# Patient Record
Sex: FEMALE | Race: WHITE | URBAN - METROPOLITAN AREA
[De-identification: names, ages, dates, MRNs, and addresses within clinical notes are randomized per-mention and may not be internally consistent; named-entity substitution may affect disease eponyms.]

---

## 2017-12-11 ENCOUNTER — OFFICE VISIT (OUTPATIENT)
Dept: DERMATOLOGY | Age: 47
End: 2017-12-11

## 2017-12-11 DIAGNOSIS — L71.9 ROSACEA: Primary | ICD-10-CM

## 2017-12-11 PROCEDURE — 99202 OFFICE O/P NEW SF 15 MIN: CPT | Performed by: DERMATOLOGY

## 2017-12-11 RX ORDER — IVERMECTIN 10 MG/G
CREAM TOPICAL
COMMUNITY
End: 2019-09-18

## 2017-12-11 NOTE — PROGRESS NOTES
[facility]) Dermatology  Edmar Salmon M.D.  [phone]       Ranjan Herring  1970    41 y.o. female     Date of Visit: 12/11/2017    Chief Complaint:   Chief Complaint   Patient presents with   [address] [facility] patient here today for rosacea        I was asked to see this patient by Dr. Burris ref. provider found. History of Present Illness:  1. Patient moved here from [place] to establish care. Has had vascular rosacea for years. Was initially treated with MetroCream, which was slightly irritating. Then more recently changed to topical ivermectin 1% cream.  Has uses inconsistently for about 5 months with minimal improvement. Has also had laser done twice-IPL and more recently spot treatment of a telangiectasia on her chin. Noted that laser did result in improvement but her erythema had recurred within a few months. Does not develop inflammatory papules. Has tried Trenda Tyra previously, but does not like the idea of something that is not truly leading to resolution. Rhytides forehead and left lower cutaneous lip-has been using samples of Retin-A Micro 0.08%. Would like new samples. Does not want fillers or Botox      Review of Systems:  Constitutional: Reports general sense of well-being       Past Medical History, Surgical History, Family History, Medications and Allergies reviewed. Social History:   Social History     Social History    Marital status:      Spouse name: N/A    Number of children: N/A    Years of education: N/A     Occupational History    Not on file. Social History Main Topics    Smoking status: Never Smoker    Smokeless tobacco: Never Used    Alcohol use Yes    Drug use: Unknown    Sexual activity: Not on file     Other Topics Concern    Not on file     Social History Narrative    No narrative on file       Physical Examination       -General: Well-appearing, NAD  Well-developed well-nourished.   Background erythema forehead, nose, paranasal cheeks, chin. No active inflammatory papules. Assessment and Plan     1. Rosacea - Discussed vascular rosacea. Discussed consultation for laser- [facility]. Sample of Mirvaso and Rhofade given to patient. She wants to continue topical ivermectin and try this twice a day consistently. Gentle soaps, moisturizer with sunscreen,    2. Rhytides- Discussed Botox and fillers-prefers sample of Retin-A Micro 0.08, which was given to the patient.   Reviewed proper use and side effects

## 2017-12-15 ENCOUNTER — TELEPHONE (OUTPATIENT)
Dept: DERMATOLOGY | Age: 47
End: 2017-12-15

## 2018-08-30 ENCOUNTER — TELEPHONE (OUTPATIENT)
Dept: DERMATOLOGY | Age: 48
End: 2018-08-30

## 2018-08-30 NOTE — TELEPHONE ENCOUNTER
Zaina is scheduled in February for laser on her face, rosacea. She just had a few questions about the procedure. She is referred by Dr. Niurka You.  [phone]

## 2018-09-04 NOTE — TELEPHONE ENCOUNTER
Spoke to patient regarding laser questions:    Patient would be more comfortable just starting with a small treament area on her face due to patient's comfort level. Patient scheduled in February for NP-laser for rosacea.

## 2018-11-05 ENCOUNTER — OFFICE VISIT (OUTPATIENT)
Dept: DERMATOLOGY | Age: 48
End: 2018-11-05
Payer: COMMERCIAL

## 2018-11-05 DIAGNOSIS — D22.9 BENIGN NEVUS: ICD-10-CM

## 2018-11-05 DIAGNOSIS — L71.9 ROSACEA: Primary | ICD-10-CM

## 2018-11-05 DIAGNOSIS — L57.8 DIFFUSE PHOTODAMAGE OF SKIN: ICD-10-CM

## 2018-11-05 PROCEDURE — 99213 OFFICE O/P EST LOW 20 MIN: CPT | Performed by: DERMATOLOGY

## 2018-11-05 NOTE — PROGRESS NOTES
[facility]) Dermatology  Charis Antunez M.D.  [phone]       Devora Simental  1970    57 y.o. female     Date of Visit: 11/5/2018    Chief Complaint:   Chief Complaint   Patient presents with    Rosacea     face   yearly check    Other     couple new spots on face        I was asked to see this patient by Dr. Burris ref. provider found. History of Present Illness:  1. Patient presents today for follow-up of rosacea-progressive erythema of her face. Would like to pursue laser. Has previously tried Mirvaso-does not like temporary improvement. Looking for a more definitive fix. Has had IPL previously and did have good improvement, slowly recurrence over years. Has noticed more flushing with recent hot flashes. Previous treatments include IPL, MetroCream, topical ivermectin, Mirvaso    Uses samples of Retin-A Micro 0.08% for her forehead and lower cutaneous lip. Happy with improvement. Does not have difficulty with erythema or scale- not interested in Botox her fill her      Review of Systems:  Constitutional: Reports general sense of well-being       Past Medical History, Surgical History, Family History, Medications and Allergies reviewed. Social History:   Social History     Social History    Marital status:      Spouse name: N/A    Number of children: N/A    Years of education: N/A     Occupational History    Not on file. Social History Main Topics    Smoking status: Never Smoker    Smokeless tobacco: Never Used    Alcohol use Yes    Drug use: Unknown    Sexual activity: Not on file     Other Topics Concern    Not on file     Social History Narrative    No narrative on file       Physical Examination       -General: Well-appearing, NAD  Mild background erythema of her face. No discrete inflammatory papules. Scattered benign nevi. Scattered freckles and lentigines      Assessment and Plan     1. Rosacea -Pursue laser.   Discussed Mirvaso or Rhofade for episodic

## 2019-02-07 ENCOUNTER — PROCEDURE VISIT (OUTPATIENT)
Dept: DERMATOLOGY | Age: 49
End: 2019-02-07

## 2019-02-07 DIAGNOSIS — Z41.1 ELECTIVE PROCEDURE FOR UNACCEPTABLE COSMETIC APPEARANCE: ICD-10-CM

## 2019-02-07 DIAGNOSIS — L71.9 ROSACEA: Primary | ICD-10-CM

## 2019-02-07 PROCEDURE — DM01315 VBEAM LASER MEDIUM OR 3 AREAS: Performed by: DERMATOLOGY

## 2019-03-12 ENCOUNTER — OFFICE VISIT (OUTPATIENT)
Dept: DERMATOLOGY | Age: 49
End: 2019-03-12

## 2019-03-12 DIAGNOSIS — L71.9 ROSACEA: Primary | ICD-10-CM

## 2019-03-12 DIAGNOSIS — Z41.1 ELECTIVE PROCEDURE FOR UNACCEPTABLE COSMETIC APPEARANCE: ICD-10-CM

## 2019-03-12 PROCEDURE — DM01310 VBEAM LASER SMALL OR 2 AREAS: Performed by: DERMATOLOGY

## 2019-04-23 ENCOUNTER — PROCEDURE VISIT (OUTPATIENT)
Dept: DERMATOLOGY | Age: 49
End: 2019-04-23

## 2019-04-23 DIAGNOSIS — Z41.1 ELECTIVE PROCEDURE FOR UNACCEPTABLE COSMETIC APPEARANCE: ICD-10-CM

## 2019-04-23 DIAGNOSIS — L71.9 ROSACEA: Primary | ICD-10-CM

## 2019-04-23 PROCEDURE — DM01370: Performed by: DERMATOLOGY

## 2019-04-23 PROCEDURE — 99999 PR OFFICE/OUTPT VISIT,PROCEDURE ONLY: CPT | Performed by: DERMATOLOGY

## 2019-04-23 NOTE — PROGRESS NOTES
Laser Procedure Note       Zaina Kincaid   YOB: 1970    DATE OF VISIT:  2019  Chief Complaint   Patient presents with    Laser Treatment     LASER: Vbeam #3  DIAGNOSIS:  Erythema/rosacea    F/u - 2 trx - last ~1 month ago with improvement but not as much as she was hoping for. Main c/o is erythema; has tried mirvaso in the past but is interested in more long-term improvement. Had IPL in the past - had some improvmenet but was many years ago. We discussed that more improvement can and should happen with multiple trx. Encouraged her to proceed with second trx and then re-eval in 1 month. We discussed treatment options, including no treatment as well as the following:  - need for multiple treatments and risk of incomplete clearance, recurrence  - risk of erythema, edema, purpura, dyspigmentation and scarring    PATIENT IDENTIFIED PER PROTOCOL: yes  LOCATION(S): face  VERIFIED AND MARKED: yes  TECHNIQUES, RISKS, BENEFITS AND ALTERNATIVES EXPLAINED: yes  CONSENT SIGNED, WITNESSED AND DATED: yes        OPERATIVE REPORT    DIAGNOSIS, LOCATION, PROCEDURE RECONFIRMED: yes   APPROPRIATE EYE PROTECTION for PATIENT: yes  APPROPRIATE EYE PROTECTION for PROVIDER and OTHERS PRESENT: yes  ANESTHESIA/PRE-OP MEDICATIONS: none  LASER SETTINGS:  (1)  WAVELENGTH: 595  LENS: 10  FLUENCE: 7.5  PULSE DURATION: 10  COOLIN/20    PROCEDURE NOTE:  Cheeks, nose, chin and focally FH treated with single and focally double pass with above settings. POST-OPERATIVE CARE/DISPOSITION: ice pack  COMPLICATIONS: none  MEDICATIONS: none  WOUND CARE INSTRUCTIONS PROVIDED: yes    F/u 5-6 mos. NC today - she wasn't sure if she wanted/needed another trx and I encouraged her to trx anyway with no fee to see if she has further improvement or not. *Halog sampled for dermatitis under the ring.

## 2019-04-24 ENCOUNTER — PATIENT MESSAGE (OUTPATIENT)
Dept: DERMATOLOGY | Age: 49
End: 2019-04-24

## 2019-04-24 NOTE — TELEPHONE ENCOUNTER
From: Cassi Roper  To: Zahra Cano MD  Sent: 4/24/2019 6:41 AM EDT  Subject: Visit Follow-Up Question    Good morning Dr. Cosimo Boas,    The sun spots that you froze yesterday on my face are really dark brown now and look worse than they did. Is this normal? I have never had this procedure done before, so I don't know what to expect.      Thank you,  Zaina

## 2019-04-29 ENCOUNTER — TELEPHONE (OUTPATIENT)
Dept: DERMATOLOGY | Age: 49
End: 2019-04-29

## 2019-04-29 NOTE — TELEPHONE ENCOUNTER
Pt c/b cell [phone]  Pt states:   - calling per follow up last week 4.23.2019   - wants to discuss future appts  Please call to discuss thanks

## 2019-04-29 NOTE — TELEPHONE ENCOUNTER
Patient needs to schedule a few more appts and reschedule current appts. Patient will return call to the office.

## 2019-04-30 NOTE — TELEPHONE ENCOUNTER
Patient returning your call. She is available all morning. Need to do scheduling for cosmetics.      [phone]

## 2019-09-18 ENCOUNTER — OFFICE VISIT (OUTPATIENT)
Dept: DERMATOLOGY | Age: 49
End: 2019-09-18
Payer: COMMERCIAL

## 2019-09-18 DIAGNOSIS — L71.9 ROSACEA: Primary | ICD-10-CM

## 2019-09-18 DIAGNOSIS — D22.9 BENIGN NEVUS: ICD-10-CM

## 2019-09-18 DIAGNOSIS — L57.8 DIFFUSE PHOTODAMAGE OF SKIN: ICD-10-CM

## 2019-09-18 PROCEDURE — G8421 BMI NOT CALCULATED: HCPCS | Performed by: DERMATOLOGY

## 2019-09-18 PROCEDURE — 99213 OFFICE O/P EST LOW 20 MIN: CPT | Performed by: DERMATOLOGY

## 2019-09-18 PROCEDURE — 1036F TOBACCO NON-USER: CPT | Performed by: DERMATOLOGY

## 2019-09-18 PROCEDURE — G8427 DOCREV CUR MEDS BY ELIG CLIN: HCPCS | Performed by: DERMATOLOGY

## 2019-09-18 NOTE — PROGRESS NOTES
status: Never Smoker    Smokeless tobacco: Never Used   Substance and Sexual Activity    Alcohol use: Yes    Drug use: Not on file    Sexual activity: Not on file   Lifestyle    Physical activity:     Days per week: Not on file     Minutes per session: Not on file    Stress: Not on file   Relationships    Social connections:     Talks on phone: Not on file     Gets together: Not on file     Attends Religion service: Not on file     Active member of club or organization: Not on file     Attends meetings of clubs or organizations: Not on file     Relationship status: Not on file    Intimate partner violence:     Fear of current or ex partner: Not on file     Emotionally abused: Not on file     Physically abused: Not on file     Forced sexual activity: Not on file   Other Topics Concern    Not on file   Social History Narrative    Not on file       Physical Examination       -General: Well-appearing, NAD  Background erythema of face but no active inflammatory papules. Left cheek with 2 3 mm flesh-colored raised papules with intact skin marking      Assessment and Plan     1. Rosacea-Per [employer]. No samples of Rhofade or Mirvaso today   2. Benign nevus - Benign acquired melanocytic nevi  -Recommend monthly self skin exams   -Educated regarding the ABCDEs of melanoma detection   -Call for any new/changing moles or concerning lesions  -Reviewed sun protective behavior -- sun avoidance during the peak hours of the day, sun-protective clothing (including hat and sunglasses), sunscreen use (water resistant, broad spectrum, SPF at least 30, need for reapplication every 2 to 3 hours), avoidance of tanning beds      3. Diffuse photodamage of skin-hats, sunscreen, sun avoidance. Sample of Retin-A Micro 0.08% given to the patient-discussed judicious use and potential for irritation.   Reviewed side effects and contraindications

## 2019-12-10 ENCOUNTER — PROCEDURE VISIT (OUTPATIENT)
Dept: DERMATOLOGY | Age: 49
End: 2019-12-10

## 2019-12-10 DIAGNOSIS — Z41.1 ELECTIVE PROCEDURE FOR UNACCEPTABLE COSMETIC APPEARANCE: ICD-10-CM

## 2019-12-10 DIAGNOSIS — L71.9 ROSACEA: Primary | ICD-10-CM

## 2019-12-10 PROCEDURE — DM01310 VBEAM LASER SMALL OR 2 AREAS: Performed by: DERMATOLOGY

## 2020-01-14 ENCOUNTER — OFFICE VISIT (OUTPATIENT)
Dept: DERMATOLOGY | Age: 50
End: 2020-01-14

## 2020-01-14 PROCEDURE — DM01310 VBEAM LASER SMALL OR 2 AREAS: Performed by: DERMATOLOGY

## 2020-01-14 NOTE — PROGRESS NOTES
Laser Procedure Note       Zaina Kincaid   YOB: 1970    DATE OF VISIT:  2020  Chief Complaint   Patient presents with    Laser Treatment     LASER: Vbeam #5  DIAGNOSIS:  Erythema/rosacea    *Has been helping take care of her mother-in-law and father-in-law recently-father-in-law had a stroke it is doing better    F/u - 4 trx - last was  and  with improvement. Main c/o is erythema; has tried mirvaso in the past but is interested in more long-term improvement. Has had more hot flashes recently and feels that she has had some worsening and would like treated again. Didn't feel that she had as much swelling, discomfort or improvement with last trx . Had IPL in the past - had some improvmenet but was many years ago. We discussed that more improvement can and should happen with multiple trx. Encouraged her to proceed with this trx and then re-eval in 1 month. Will double stack with this trx. We discussed treatment options, including no treatment as well as the following:  - need for multiple treatments and risk of incomplete clearance, recurrence  - risk of erythema, edema, purpura, dyspigmentation and scarring    PATIENT IDENTIFIED PER PROTOCOL: yes  LOCATION(S): face  VERIFIED AND MARKED: yes  TECHNIQUES, RISKS, BENEFITS AND ALTERNATIVES EXPLAINED: yes  CONSENT SIGNED, WITNESSED AND DATED: yes        OPERATIVE REPORT    DIAGNOSIS, LOCATION, PROCEDURE RECONFIRMED: yes   APPROPRIATE EYE PROTECTION for PATIENT: yes  APPROPRIATE EYE PROTECTION for PROVIDER and OTHERS PRESENT: yes  ANESTHESIA/PRE-OP MEDICATIONS: none  LASER SETTINGS:  (1)  WAVELENGTH: 595  LENS: 10  FLUENCE: 7.5  PULSE DURATION: 10  COOLIN/20    PROCEDURE NOTE:  Cheeks, nose, chin and focally FH treated with double stacked pulses with above settings.     POST-OPERATIVE CARE/DISPOSITION: ice pack  COMPLICATIONS: none  MEDICATIONS: rec consider clonidine for flushing but she notes low BP baseline and prefers not to  WOUND CARE INSTRUCTIONS PROVIDED: yes    F/u 5-6 mos.     [id number] [facility]

## 2020-01-15 LAB
HPV COMMENT: NORMAL
HPV TYPE 16: NOT DETECTED
HPV TYPE 18: NOT DETECTED
HPVOH (OTHER TYPES): NOT DETECTED

## 2020-02-24 ENCOUNTER — PROCEDURE VISIT (OUTPATIENT)
Dept: DERMATOLOGY | Age: 50
End: 2020-02-24

## 2020-02-24 PROCEDURE — DM01310 VBEAM LASER SMALL OR 2 AREAS: Performed by: DERMATOLOGY

## 2020-02-24 NOTE — PROGRESS NOTES
Laser Procedure Note       Zaina Kincaid   YOB: 1970    DATE OF VISIT:  2020  Chief Complaint   Patient presents with    Laser Treatment     LASER: Melody Coleman #6  DIAGNOSIS:  Erythema/rosacea    *Has been helping take care of her mother-in-law and father-in-law recently-father-in-law had a stroke it is doing better    F/u - last was ,  and  with improvement. Main c/o is erythema; has tried mirvaso in the past but is interested in more long-term improvement. Has had more hot flashes recently and feels that she has had some worsening and would like treated again. More improvement with  trx with double stacks of pulses. Had IPL in the past - had some improvmenet but was many years ago. We discussed that more improvement can and should happen with multiple trx. Will double stack with this trx. We discussed treatment options, including no treatment as well as the following:  - need for multiple treatments and risk of incomplete clearance, recurrence  - risk of erythema, edema, purpura, dyspigmentation and scarring    PATIENT IDENTIFIED PER PROTOCOL: yes  LOCATION(S): face  VERIFIED AND MARKED: yes  TECHNIQUES, RISKS, BENEFITS AND ALTERNATIVES EXPLAINED: yes  CONSENT SIGNED, WITNESSED AND DATED: yes        OPERATIVE REPORT    DIAGNOSIS, LOCATION, PROCEDURE RECONFIRMED: yes   APPROPRIATE EYE PROTECTION for PATIENT: yes  APPROPRIATE EYE PROTECTION for PROVIDER and OTHERS PRESENT: yes  ANESTHESIA/PRE-OP MEDICATIONS: none  LASER SETTINGS:  (1)  WAVELENGTH: 595  LENS: 10  FLUENCE: 7.5  PULSE DURATION: 10  COOLIN/20    PROCEDURE NOTE:  Cheeks, nose, chin and focally FH treated with double stacked pulses with above settings. POST-OPERATIVE CARE/DISPOSITION: ice pack  COMPLICATIONS: none  MEDICATIONS: rec consider clonidine for flushing but she notes low BP baseline and prefers not to  WOUND CARE INSTRUCTIONS PROVIDED: yes    F/u 1-2 mos.      [id number] [facility]

## 2020-06-05 ENCOUNTER — TELEPHONE (OUTPATIENT)
Dept: DERMATOLOGY | Age: 50
End: 2020-06-05

## 2020-09-10 LAB
ESTRADIOL LEVEL: 114 PG/ML
FOLLICLE STIMULATING HORMONE: 40.5 MIU/ML

## 2020-10-19 ENCOUNTER — OFFICE VISIT (OUTPATIENT)
Dept: DERMATOLOGY | Age: 50
End: 2020-10-19
Payer: COMMERCIAL

## 2020-10-19 VITALS — TEMPERATURE: 97.9 F

## 2020-10-19 PROCEDURE — 99213 OFFICE O/P EST LOW 20 MIN: CPT | Performed by: DERMATOLOGY

## 2020-10-19 RX ORDER — DIAZEPAM 5 MG/1
TABLET ORAL
COMMUNITY
Start: 2020-07-16

## 2020-10-19 NOTE — PROGRESS NOTES
[facility]) Dermatology  Baltazar Strong M.D.  [phone]       Sky Angulo  1970    52 y.o. female     Date of Visit: 10/19/2020    Chief Complaint:   Chief Complaint   Patient presents with    Skin Exam        I was asked to see this patient by Dr. Burris ref. provider found. History of Present Illness:  1. Follow-up    Rosacea-multiple treatments with Dr. Dafne Hayes, then discontinued due to Covid outbreak. Plans to resume upcoming treatments in the next 2 weeks-does think that she had some improvement with reduction of her erythema. Would also like to try Mirvaso again-Rhofade was irritating. Lentigo left nasal sidewall-treated previously with liquid nitrogen, some improvement. Would like another treatment. Mild background photodamage-does wear hats and sunscreen regularly    Multiple nevi-stable in size, shape, color. Has not noticed any new or changing pigmented lesion    Fine lines-Retin-A Micro 0.08% cream to focal areas of involvement. Tolerates this without difficulty8          Review of Systems:  Constitutional: Reports general sense of well-being       Past Medical History, Surgical History, Family History, Medications and Allergies reviewed. Social History:   Social History     Socioeconomic History    Marital status:      Spouse name: Not on file    Number of children: Not on file    Years of education: Not on file    Highest education level: Not on file   Occupational History    Not on file   Social Needs    Financial resource strain: Not on file    Food insecurity     Worry: Not on file     Inability: Not on file    Transportation needs     Medical: Not on file     Non-medical: Not on file   Tobacco Use    Smoking status: Never Smoker    Smokeless tobacco: Never Used   Substance and Sexual Activity    Alcohol use:  Yes    Drug use: Not on file    Sexual activity: Not on file   Lifestyle    Physical activity     Days per week: Not on file     Minutes per session: Not on file    Stress: Not on file   Relationships    Social connections     Talks on phone: Not on file     Gets together: Not on file     Attends Orthodox service: Not on file     Active member of club or organization: Not on file     Attends meetings of clubs or organizations: Not on file     Relationship status: Not on file    Intimate partner violence     Fear of current or ex partner: Not on file     Emotionally abused: Not on file     Physically abused: Not on file     Forced sexual activity: Not on file   Other Topics Concern    Not on file   Social History Narrative    Not on file       Physical Examination       -General: Well-appearing, NAD  1. Limited exam  Background erythema of face-rosacea. No active inflammatory papules  Light tan evenly pigmented lentigo left nasal sidewall  Mild photodamage with freckling and lentigines  Several scattered benign nevi      Assessment and Plan     1. Francisco Guevara   2. Benign nevus - Benign acquired melanocytic nevi  -Recommend monthly self skin exams   -Educated regarding the ABCDEs of melanoma detection   -Call for any new/changing moles or concerning lesions  -Reviewed sun protective behavior -- sun avoidance during the peak hours of the day, sun-protective clothing (including hat and sunglasses), sunscreen use (water resistant, broad spectrum, SPF at least 30, need for reapplication every 2 to 3 hours), avoidance of tanning beds      3. Diffuse photodamage of skin  Hats, sunscreen, sun avoidance. Retin-A Micro 0.08% cream nightly-reviewed side effects and contraindications. Samples given   4. Lentigo - -1 left nasal sidewall lesion(s) treated w/ liquid nitrogen. Edu re: risk of blister formation, discomfort, scar, hypopigmentation. Discussed wound care. Discussed vitamin D with patient-unable to take supplement due to interstitial cystitis. Unable to take dairy products.   Discussed limited outdoor sun and avoiding areas of

## 2020-11-02 ENCOUNTER — PROCEDURE VISIT (OUTPATIENT)
Dept: DERMATOLOGY | Age: 50
End: 2020-11-02

## 2020-11-02 VITALS — TEMPERATURE: 97.7 F

## 2020-11-02 PROCEDURE — DM01310 VBEAM LASER SMALL OR 2 AREAS: Performed by: DERMATOLOGY

## 2020-11-02 RX ORDER — BRIMONIDINE TARTRATE 5 MG/G
GEL TOPICAL
Qty: 45 G | Refills: 3 | Status: SHIPPED | OUTPATIENT
Start: 2020-11-02 | End: 2020-11-04 | Stop reason: SDUPTHER

## 2020-11-02 NOTE — PROGRESS NOTES
Laser Procedure Note       Zaina Kincaid   YOB: 1970    DATE OF VISIT:  2020  Chief Complaint   Patient presents with    Laser Treatment     LASER: Vbeam - last was   DIAGNOSIS:  Erythema/rosacea    F/u - last was , ,  and  with improvement. Main c/o is erythema; has tried mirvaso in the past and would like to go back on this between treatments but feels that laser has helped some. Worse over the past 8 months without laser. Has had more hot flashes recently and feels that she has had some worsening and would like treated again. More improvement with  and  trx with double stacks of pulses. Had IPL in the past - had some improvment but was many years ago. We discussed that more improvement can and should happen with multiple trx. Will double stack with this trx. We discussed treatment options, including no treatment as well as the following:  - need for multiple treatments and risk of incomplete clearance, recurrence  - risk of erythema, edema, purpura, dyspigmentation and scarring    PATIENT IDENTIFIED PER PROTOCOL: yes  LOCATION(S): face  VERIFIED AND MARKED: yes  TECHNIQUES, RISKS, BENEFITS AND ALTERNATIVES EXPLAINED: yes  CONSENT SIGNED, WITNESSED AND DATED: yes        OPERATIVE REPORT    DIAGNOSIS, LOCATION, PROCEDURE RECONFIRMED: yes   APPROPRIATE EYE PROTECTION for PATIENT: yes  APPROPRIATE EYE PROTECTION for PROVIDER and OTHERS PRESENT: yes  ANESTHESIA/PRE-OP MEDICATIONS: none  LASER SETTINGS:  (1)  WAVELENGTH: 595  LENS: 10  FLUENCE: 7.5  PULSE DURATION: 10  COOLIN/20    PROCEDURE NOTE:  Cheeks, nose, chin and focally FH treated with double stacked pulses with above settings.     POST-OPERATIVE CARE/DISPOSITION: ice pack  COMPLICATIONS: none  MEDICATIONS: rec consider clonidine for flushing but she notes low BP baseline and prefers not to  *mirvaso to [address]: yes    F/u 1-2 mos.      [id number] [facility]

## 2020-11-04 ENCOUNTER — PATIENT MESSAGE (OUTPATIENT)
Dept: DERMATOLOGY | Age: 50
End: 2020-11-04

## 2020-11-04 RX ORDER — BRIMONIDINE TARTRATE 5 MG/G
GEL TOPICAL
Qty: 45 G | Refills: 3 | Status: SHIPPED | OUTPATIENT
Start: 2020-11-04 | End: 2022-08-23

## 2020-11-04 NOTE — TELEPHONE ENCOUNTER
From: Shira Dailey  To: Henry James MD  Sent: 11/4/2020 12:14 PM EST  Subject: Visit Follow-Up Question    Thanks for your efforts!      ----- Message -----   From:Misty Herndon MD   Sent:11/4/2020 11:09 AM EST   To:Zaina Kincaid   Subject:RE: Visit Follow-Up Question    Hi Zaina,  Let's try another pharmacy out of [place] that sometimes has some other magic formula to keep the prices down. I'll send it here and let's see what they say in terms of price. If we had samples, you would be more than welcome to 5 tubes for sure, but no one has been bringing us any samples of this one recently! [address]     - Dr. Sam Jaime      ----- Message -----   From:Zaina Kincaid   Sent:11/3/2020 2:41 PM EST   To:Misty Herndon MD   Subject:RE: Visit Minnie Plater Dr. Lety Silverman,  The pharmacy has Sandre Goes but it is $427.00. They can not use the  coupon anymore to bring it down to $75.00. He said he would get you a new price list. Obviously, I can't afford $427. Are there any other options? Any chance to get maybe 5 samples of Mirvaso? I wish Rhofade didn't make my face sting and burn. Thanks for your help,  Zaina      ----- Message -----   From:Misty Herndon MD   Sent:4/24/2019 9:56 PM EDT   To:Zaina Kincaid   Subject:RE: Visit Follow-Up Question    Hi Zaina,  This is a perfectly normal and appropriate response - these typically get a bit darker/scabbed before they peel off and heal. Keep up with a bit of aquaphor on these and treat them gently. Let me know if you have any other questions!  - Dr. Sam Jaime      ----- Message -----   From: Lisha Tidwell: 4/24/2019 6:41 AM EDT   To: Henry James MD  Subject: Visit Follow-Up Question    Good morning Dr. Lety Labs,    The sun spots that you froze yesterday on my face are really dark brown now and look worse than they did.  Is this normal? I have never had this procedure done before, so I don't know what to expect.      Thank you,  Zaina

## 2020-12-01 ENCOUNTER — PROCEDURE VISIT (OUTPATIENT)
Dept: DERMATOLOGY | Age: 50
End: 2020-12-01

## 2020-12-01 VITALS — TEMPERATURE: 97.2 F

## 2020-12-01 PROCEDURE — DM01310 VBEAM LASER SMALL OR 2 AREAS: Performed by: DERMATOLOGY

## 2020-12-01 NOTE — PROGRESS NOTES
Laser Procedure Note       Zaina Kincaid   YOB: 1970    DATE OF VISIT:  2020  Chief Complaint   Patient presents with    Procedure     VBEAM     LASER: Vbeam - last was   DIAGNOSIS:  Erythema/rosacea    F/u - last was , , ,  and  with improvement. Main c/o is erythema; has tried mirvaso in the past and would like to go back on this between treatments but is very expensive. Feels that laser has helped. More improvement with  and  and  trx with double stacks of pulses. Had IPL in the past - had some improvment but was many years ago. We discussed that more improvement can and should happen with multiple trx. Will double stack with this trx. We discussed treatment options, including no treatment as well as the following:  - need for multiple treatments and risk of incomplete clearance, recurrence  - risk of erythema, edema, purpura, dyspigmentation and scarring    PATIENT IDENTIFIED PER PROTOCOL: yes  LOCATION(S): face  VERIFIED AND MARKED: yes  TECHNIQUES, RISKS, BENEFITS AND ALTERNATIVES EXPLAINED: yes  CONSENT SIGNED, WITNESSED AND DATED: yes        OPERATIVE REPORT    DIAGNOSIS, LOCATION, PROCEDURE RECONFIRMED: yes   APPROPRIATE EYE PROTECTION for PATIENT: yes  APPROPRIATE EYE PROTECTION for PROVIDER and OTHERS PRESENT: yes  ANESTHESIA/PRE-OP MEDICATIONS: none  LASER SETTINGS:  (1)  WAVELENGTH: 595  LENS: 10  FLUENCE: 7.5  PULSE DURATION: 10  COOLIN/20    PROCEDURE NOTE:  Cheeks, nose, chin and focally FH treated with double stacked pulses with above settings. POST-OPERATIVE CARE/DISPOSITION: ice pack  COMPLICATIONS: none  MEDICATIONS: rec consider clonidine for flushing but she notes low BP baseline and prefers not to  *mirvaso to [facility] didn't help price; didn't tolerate rhofade  WOUND CARE INSTRUCTIONS PROVIDED: yes    F/u 1-2 mos.      [address] [facility]

## 2021-01-04 ENCOUNTER — PROCEDURE VISIT (OUTPATIENT)
Dept: DERMATOLOGY | Age: 51
End: 2021-01-04

## 2021-01-04 VITALS — TEMPERATURE: 97.5 F

## 2021-01-04 DIAGNOSIS — Z41.1 ELECTIVE PROCEDURE FOR UNACCEPTABLE COSMETIC APPEARANCE: ICD-10-CM

## 2021-01-04 DIAGNOSIS — L71.9 ROSACEA: Primary | ICD-10-CM

## 2021-01-04 PROCEDURE — DM01310 VBEAM LASER SMALL OR 2 AREAS: Performed by: DERMATOLOGY

## 2021-02-08 ENCOUNTER — PROCEDURE VISIT (OUTPATIENT)
Dept: DERMATOLOGY | Age: 51
End: 2021-02-08

## 2021-02-08 VITALS — TEMPERATURE: 97.5 F

## 2021-02-08 DIAGNOSIS — Z41.1 ELECTIVE PROCEDURE FOR UNACCEPTABLE COSMETIC APPEARANCE: ICD-10-CM

## 2021-02-08 DIAGNOSIS — L71.9 ROSACEA: Primary | ICD-10-CM

## 2021-02-08 PROCEDURE — DM01310 VBEAM LASER SMALL OR 2 AREAS: Performed by: DERMATOLOGY

## 2021-02-08 NOTE — PROGRESS NOTES
Laser Procedure Note       Zaina Kincaid   YOB: 1970    DATE OF VISIT:  2021  Chief Complaint   Patient presents with    Laser Treatment     LASER: Vbeam - last was 1 month ago  DIAGNOSIS:  Erythema/rosacea    F/u - last was , , , , ,  and  with improvement. Main c/o is erythema; has tried mirvaso in the past and would like to go back on this between treatments but is very expensive. Feels that laser has helped. More improvement with recent trx with double stacks of pulses. Had IPL in the past - had some improvment but was many years ago. We discussed that more improvement can and should happen with multiple trx. Will double stack with this trx. We discussed treatment options, including no treatment as well as the following:  - need for multiple treatments and risk of incomplete clearance, recurrence  - risk of erythema, edema, purpura, dyspigmentation and scarring    PATIENT IDENTIFIED PER PROTOCOL: yes  LOCATION(S): face  VERIFIED AND MARKED: yes  TECHNIQUES, RISKS, BENEFITS AND ALTERNATIVES EXPLAINED: yes  CONSENT SIGNED, WITNESSED AND DATED: yes        OPERATIVE REPORT    DIAGNOSIS, LOCATION, PROCEDURE RECONFIRMED: yes   APPROPRIATE EYE PROTECTION for PATIENT: yes  APPROPRIATE EYE PROTECTION for PROVIDER and OTHERS PRESENT: yes  ANESTHESIA/PRE-OP MEDICATIONS: none  LASER SETTINGS:  (1)  WAVELENGTH: 595  LENS: 10  FLUENCE: 7.5  PULSE DURATION: 10  COOLIN/20    PROCEDURE NOTE:  Cheeks, nose, chin and focally FH treated with double stacked pulses with above settings. POST-OPERATIVE CARE/DISPOSITION: ice pack  COMPLICATIONS: none  MEDICATIONS: rec consider clonidine for flushing but she notes low BP baseline and prefers not to  *[facility]o to [facility] didn't [facility] price; didn't tolerate rhofade  WOUND CARE INSTRUCTIONS PROVIDED: yes    F/u 1-2 mos.      [id number] [facility]

## 2021-04-05 ENCOUNTER — PROCEDURE VISIT (OUTPATIENT)
Dept: DERMATOLOGY | Age: 51
End: 2021-04-05

## 2021-04-05 VITALS — TEMPERATURE: 97.4 F

## 2021-04-05 DIAGNOSIS — L71.9 ROSACEA: Primary | ICD-10-CM

## 2021-04-05 PROCEDURE — DM01310 VBEAM LASER SMALL OR 2 AREAS: Performed by: DERMATOLOGY

## 2021-04-05 NOTE — PROGRESS NOTES
Laser Procedure Note       Zaina Kincaid   YOB: 1970    DATE OF VISIT:  2021  Chief Complaint   Patient presents with    Procedure     VBEAM-Rocacea     LASER: Vbeam - last was 2 month ago  DIAGNOSIS:  Erythema/rosacea    F/u - last was , , , , , ,  and  with improvement. Main c/o is erythema; has tried mirvaso in the past and would like to go back on this between treatments but is very expensive. Feels that laser has helped. More improvement with recent trx with double stacks of pulses. Had IPL in the past - had some improvment but was many years ago. We discussed that more improvement can and should happen with multiple trx. Will double stack with this trx. We discussed treatment options, including no treatment as well as the following:  - need for multiple treatments and risk of incomplete clearance, recurrence  - risk of erythema, edema, purpura, dyspigmentation and scarring    PATIENT IDENTIFIED PER PROTOCOL: yes  LOCATION(S): face  VERIFIED AND MARKED: yes  TECHNIQUES, RISKS, BENEFITS AND ALTERNATIVES EXPLAINED: yes  CONSENT SIGNED, WITNESSED AND DATED: yes        OPERATIVE REPORT    DIAGNOSIS, LOCATION, PROCEDURE RECONFIRMED: yes   APPROPRIATE EYE PROTECTION for PATIENT: yes  APPROPRIATE EYE PROTECTION for PROVIDER and OTHERS PRESENT: yes  ANESTHESIA/PRE-OP MEDICATIONS: none  LASER SETTINGS:  (1)  WAVELENGTH: 595  LENS: 10  FLUENCE: 7.5  PULSE DURATION: 10  COOLIN/20    PROCEDURE NOTE:  Cheeks, nose, chin and focally FH treated with double stacked pulses with above settings. POST-OPERATIVE CARE/DISPOSITION: ice pack  COMPLICATIONS: none  MEDICATIONS: rec consider clonidine for flushing but she notes low BP baseline and prefers not to  *mirvaso to [email or web address] didn't help price; didn't tolerate rhofade  WOUND CARE INSTRUCTIONS PROVIDED: yes    F/u 1-2 mos.      200    We also briefly discussed Botox and filler for glabellar and focal perioral lines where she is getting some make-up accumulation that bothers her.

## 2021-05-03 ENCOUNTER — PROCEDURE VISIT (OUTPATIENT)
Dept: DERMATOLOGY | Age: 51
End: 2021-05-03

## 2021-05-03 VITALS — TEMPERATURE: 98.4 F

## 2021-05-03 DIAGNOSIS — Z41.1 ELECTIVE PROCEDURE FOR UNACCEPTABLE COSMETIC APPEARANCE: ICD-10-CM

## 2021-05-03 DIAGNOSIS — L71.9 ROSACEA: Primary | ICD-10-CM

## 2021-05-03 PROCEDURE — DM01310 VBEAM LASER SMALL OR 2 AREAS: Performed by: DERMATOLOGY

## 2021-05-03 NOTE — PROGRESS NOTES
Laser Procedure Note       Zaina Kincaid   YOB: 1970    DATE OF VISIT:  5/3/2021  Chief Complaint   Patient presents with    Procedure     LASER-VBEAM     LASER: Vbeam - last was ~1 month ago  DIAGNOSIS:  Erythema/rosacea    F/u - last was , , , , , , ,  and  with improvement. Main c/o is erythema; has tried mirvaso in the past and would like to go back on this between treatments but is very expensive. Feels that laser has helped. More improvement with recent trx with double stacks of pulses. Had IPL in the past - had some improvment but was many years ago. We discussed that more improvement can and should happen with multiple trx. Will double stack with this trx. We discussed treatment options, including no treatment as well as the following:  - need for multiple treatments and risk of incomplete clearance, recurrence  - risk of erythema, edema, purpura, dyspigmentation and scarring    PATIENT IDENTIFIED PER PROTOCOL: yes  LOCATION(S): face  VERIFIED AND MARKED: yes  TECHNIQUES, RISKS, BENEFITS AND ALTERNATIVES EXPLAINED: yes  CONSENT SIGNED, WITNESSED AND DATED: yes        OPERATIVE REPORT    DIAGNOSIS, LOCATION, PROCEDURE RECONFIRMED: yes   APPROPRIATE EYE PROTECTION for PATIENT: yes  APPROPRIATE EYE PROTECTION for PROVIDER and OTHERS PRESENT: yes  ANESTHESIA/PRE-OP MEDICATIONS: none  LASER SETTINGS:  (1)  WAVELENGTH: 595  LENS: 10  FLUENCE: 7.5  PULSE DURATION: 10  COOLIN/20    PROCEDURE NOTE:  Cheeks, nose, chin and focally FH treated with double stacked pulses with above settings. POST-OPERATIVE CARE/DISPOSITION: ice pack  COMPLICATIONS: none  MEDICATIONS: rec consider clonidine for flushing but she notes low BP baseline and prefers not to  *mirvaso to [facility] didn't help price; didn't tolerate rhofade  WOUND CARE INSTRUCTIONS PROVIDED: yes    F/u 1-2 mos.      200    We also briefly discussed Botox and filler for glabellar and focal perioral lines where she is getting some make-up accumulation that bothers her.

## 2021-07-08 ENCOUNTER — TELEPHONE (OUTPATIENT)
Dept: DERMATOLOGY | Age: 51
End: 2021-07-08

## 2021-07-08 NOTE — TELEPHONE ENCOUNTER
Pt calling to schedule appt for laser in January with  pls return call back @ [phone] to discuss

## 2021-08-25 ENCOUNTER — OFFICE VISIT (OUTPATIENT)
Dept: DERMATOLOGY | Age: 51
End: 2021-08-25
Payer: COMMERCIAL

## 2021-08-25 VITALS — TEMPERATURE: 97.1 F

## 2021-08-25 DIAGNOSIS — L71.9 ROSACEA: Primary | ICD-10-CM

## 2021-08-25 DIAGNOSIS — D22.9 BENIGN NEVUS: ICD-10-CM

## 2021-08-25 PROCEDURE — 99213 OFFICE O/P EST LOW 20 MIN: CPT | Performed by: DERMATOLOGY

## 2021-08-25 NOTE — PROGRESS NOTES
[facility]) Dermatology  Cheryl Esteban M.D.  [phone]       Jessica Winters  1970    54 y.o. female     Date of Visit: 8/25/2021    Chief Complaint:   Chief Complaint   Patient presents with    Skin Exam     face and arms        I was asked to see this patient by Dr. Burris ref. provider found. History of Present Illness:  1. Patient presents today for follow-up of rosacea and nevi    Rosacea has been stable-feels that she has had improvement with both erythema and irritation through laser treatments with Dr. Sonja Shen. She completes her laser treatment through the winter, then discontinues in the summer. Does notice a slow recurrence of both erythema and irritation by the end of the summer. Anticipates restarting her laser series in November. Washes with Brian gentle face wash and uses Elta MD physical sunscreen, has a CeraVe moisturizer without sunscreen that she can use as well without irritation. Does use Mirvaso samples. Mirvaso is irritating. Nevi face, forearms. Has not noticed any changing in size, shape, color. Asymptomatic. Fine-lines chin, glabella-patient considering Botox/filler, prefers to treat with Retin-A 0.08% cream, samples. Review of Systems:  Constitutional: Reports general sense of well-being       Past Medical History, Surgical History, Family History, Medications and Allergies reviewed. Social History:   Social History     Socioeconomic History    Marital status:      Spouse name: Not on file    Number of children: Not on file    Years of education: Not on file    Highest education level: Not on file   Occupational History    Not on file   Tobacco Use    Smoking status: Never Smoker    Smokeless tobacco: Never Used   Vaping Use    Vaping Use: Never used   Substance and Sexual Activity    Alcohol use:  Yes    Drug use: Not on file    Sexual activity: Not on file   Other Topics Concern    Not on file   Social History Narrative    Not on file Social Determinants of Health     Financial Resource Strain:     Difficulty of Paying Living Expenses:    Food Insecurity:     Worried About Running Out of Food in the Last Year:     [address] in the Last Year:    Transportation Needs:     Lack of Transportation (Medical):  Lack of Transportation (Non-Medical):    Physical Activity:     Days of Exercise per Week:     Minutes of Exercise per Session:    Stress:     Feeling of Stress :    Social Connections:     Frequency of Communication with Friends and Family:     Frequency of Social Gatherings with Friends and Family:     Attends [employer] Services:     Active Member of Clubs or Organizations:     Attends Club or Organization Meetings:     Marital Status:    Intimate Partner Violence:     Fear of Current or Ex-Partner:     Emotionally Abused:     Physically Abused:     Sexually Abused:        Physical Examination       -General: Well-appearing, NAD  1. Well-developed well-nourished Limited exam-face, forearms  Mild background erythema nose, paranasal cheeks  Dynamic crease lower lip, glabella  Scattered benign nevi      Assessment and Plan     1. Rosacea-continue Mirvaso-samples when we have them. Continue gentle face wash, moisturizer, sunscreen. Resume laser with Dr. Laura Rivera this fall. 2. Benign nevus - Benign acquired melanocytic nevi  -Recommend monthly self skin exams   -Educated regarding the ABCDEs of melanoma detection   -Call for any new/changing moles or concerning lesions  -Reviewed sun protective behavior -- sun avoidance during the peak hours of the day, sun-protective clothing (including hat and sunglasses), sunscreen use (water resistant, broad spectrum, SPF at least 30, need for reapplication every 2 to 3 hours), avoidance of tanning beds        3. Fine lines-Retin-A 0.06% samples given to patient.   Reviewed proper use and side effects

## 2021-11-08 ENCOUNTER — PROCEDURE VISIT (OUTPATIENT)
Dept: DERMATOLOGY | Age: 51
End: 2021-11-08

## 2021-11-08 VITALS — TEMPERATURE: 97.2 F

## 2021-11-08 DIAGNOSIS — L71.9 ROSACEA: Primary | ICD-10-CM

## 2021-11-08 PROCEDURE — DM01310 VBEAM LASER SMALL OR 2 AREAS: Performed by: DERMATOLOGY

## 2021-12-09 ENCOUNTER — PROCEDURE VISIT (OUTPATIENT)
Dept: DERMATOLOGY | Age: 51
End: 2021-12-09

## 2021-12-09 DIAGNOSIS — L71.9 ROSACEA: Primary | ICD-10-CM

## 2021-12-09 PROCEDURE — DM01310 VBEAM LASER SMALL OR 2 AREAS: Performed by: DERMATOLOGY

## 2021-12-09 NOTE — PROGRESS NOTES
Laser Procedure Note       Zaina Kincaid   YOB: 1970    DATE OF VISIT:  2021  Chief Complaint   Patient presents with    Rosacea     v beam     LASER: Vbeam - last was ~6 mos ago ()  DIAGNOSIS:  Dereck Napier    Trx , , , , , , , , ,  and  with improvement. Main c/o is erythema; has tried mirvaso in the past and would like to go back on this between treatments but is very expensive. Laser has helped. More improvement with recent trx with double stacks of pulses. Had IPL in the past - had some improvment but was many years ago. Will double stack with this trx. We discussed treatment options, including no treatment as well as the following:  - need for multiple treatments and risk of incomplete clearance, recurrence  - risk of erythema, edema, purpura, dyspigmentation and scarring    PATIENT IDENTIFIED PER PROTOCOL: yes  LOCATION(S): face  VERIFIED AND MARKED: yes  TECHNIQUES, RISKS, BENEFITS AND ALTERNATIVES EXPLAINED: yes  CONSENT SIGNED, WITNESSED AND DATED: yes        OPERATIVE REPORT    DIAGNOSIS, LOCATION, PROCEDURE RECONFIRMED: yes   APPROPRIATE EYE PROTECTION for PATIENT: yes  APPROPRIATE EYE PROTECTION for PROVIDER and OTHERS PRESENT: yes  ANESTHESIA/PRE-OP MEDICATIONS: none  LASER SETTINGS:  (1)  WAVELENGTH: 595  LENS: 10  FLUENCE: 7.5  PULSE DURATION: 10  COOLIN/20    PROCEDURE NOTE:  Cheeks, nose, chin and FH treated with double stacked pulses with above settings. POST-OPERATIVE CARE/DISPOSITION: ice pack  COMPLICATIONS: none  MEDICATIONS: previously discussed consider clonidine for flushing but she notes low BP baseline and prefers not to  *mirvaso to [facility] [employer] [facility] didn't help price; didn't tolerate rhofade  WOUND CARE INSTRUCTIONS PROVIDED: yes    F/u 1-2 mos.      200    We also previously briefly discussed Botox and filler for glabellar and focal perioral lines where she is getting some make-up accumulation that bothers her.

## 2022-01-13 ENCOUNTER — PROCEDURE VISIT (OUTPATIENT)
Dept: DERMATOLOGY | Age: 52
End: 2022-01-13

## 2022-01-13 VITALS — TEMPERATURE: 93.2 F

## 2022-01-13 DIAGNOSIS — L71.9 ROSACEA: Primary | ICD-10-CM

## 2022-01-13 PROCEDURE — DM01310 VBEAM LASER SMALL OR 2 AREAS: Performed by: DERMATOLOGY

## 2022-01-13 NOTE — PATIENT INSTRUCTIONS
Following the procedure, the treated areas may be red or appear bruised and will likely be swollen for a few hours or up to a few days. The affected areas should be treated delicately following treatment. Discomfort and swelling should be treated with the application of hourly cool compresses the evening of the procedure. Avoid applying ice directly to the skin. If your face was treated, you may want to sleep with your head elevated on an extra pillow to help minimize swelling. Wear sunscreen daily and avoid strenuous activity following rosacea treatments to optimize results. Avoid extra aspirin, ibuprofen, vitamin E following the procedure - this may increase your chance of bruising. Improper care of the treated area while the discoloration is present may increase the chance of scarring or skin textural changes to the treated area.     Please call the office if you have excessive discomfort, herpes simplex virus flare, or burns, blisters, or scabbing.    $200.00

## 2022-01-13 NOTE — PROGRESS NOTES
Laser Procedure Note       Zaina Kincaid   YOB: 1970    DATE OF VISIT:  2022  Chief Complaint   Patient presents with    Laser Treatment     vbeam      LASER: Vbeam - last was ~6 mos ago ()  DIAGNOSIS:  Mohamud Doss    Trx m , , , , , , , , ,  and  with improvement. Main c/o is erythema; has tried mirvaso in the past and would like to go back on this between treatments but is very expensive. Laser has helped. More improvement with recent trx with double stacks of pulses. Had IPL in the past - had some improvment but was many years ago. Double stack with this trx. We discussed treatment options, including no treatment as well as the following:  - need for multiple treatments and risk of incomplete clearance, recurrence  - risk of erythema, edema, purpura, dyspigmentation and scarring    PATIENT IDENTIFIED PER PROTOCOL: yes  LOCATION(S): face  VERIFIED AND MARKED: yes  TECHNIQUES, RISKS, BENEFITS AND ALTERNATIVES EXPLAINED: yes  CONSENT SIGNED, WITNESSED AND DATED: yes        OPERATIVE REPORT    DIAGNOSIS, LOCATION, PROCEDURE RECONFIRMED: yes   APPROPRIATE EYE PROTECTION for PATIENT: yes  APPROPRIATE EYE PROTECTION for PROVIDER and OTHERS PRESENT: yes  ANESTHESIA/PRE-OP MEDICATIONS: none  LASER SETTINGS:  (1)  WAVELENGTH: 595  LENS: 10  FLUENCE: 7.5  PULSE DURATION: 10  COOLIN/20    PROCEDURE NOTE:  Cheeks, nose, chin and FH treated with double stacked pulses with above settings. POST-OPERATIVE CARE/DISPOSITION: ice pack  COMPLICATIONS: none  MEDICATIONS: previously discussed consider clonidine for flushing but she notes low BP baseline and prefers not to  *mirvaso to [facility] didn't help price; didn't tolerate rhofade  WOUND CARE INSTRUCTIONS PROVIDED: yes    F/u 1-2 mos.      200    We also previously briefly discussed Botox and filler for glabellar and focal perioral lines where she is getting some make-up accumulation that bothers her.

## 2022-02-24 ENCOUNTER — PROCEDURE VISIT (OUTPATIENT)
Dept: DERMATOLOGY | Age: 52
End: 2022-02-24

## 2022-02-24 VITALS — TEMPERATURE: 98.2 F

## 2022-02-24 DIAGNOSIS — L71.9 ROSACEA: Primary | ICD-10-CM

## 2022-02-24 PROCEDURE — DM01310 VBEAM LASER SMALL OR 2 AREAS: Performed by: DERMATOLOGY

## 2022-02-24 NOTE — PROGRESS NOTES
Laser Procedure Note       Zaina Kincaid   YOB: 1970    DATE OF VISIT:  2022  Chief Complaint   Patient presents with    Laser Treatment     LASER: Vbeam  DIAGNOSIS:  Erythema/rosacea    Trx , , , , , , , , , ,  and  with improvement. Main c/o is erythema; has tried mirvaso in the past and would like to go back on this between treatments but is very expensive. Laser has helped. More improvement with recent trx with double stacks of pulses. Had IPL in the past - had some improvment but was many years ago. Double stack with this trx. We discussed treatment options, including no treatment as well as the following:  - need for multiple treatments and risk of incomplete clearance, recurrence  - risk of erythema, edema, purpura, dyspigmentation and scarring    PATIENT IDENTIFIED PER PROTOCOL: yes  LOCATION(S): face  VERIFIED AND MARKED: yes  TECHNIQUES, RISKS, BENEFITS AND ALTERNATIVES EXPLAINED: yes  CONSENT SIGNED, WITNESSED AND DATED: yes        OPERATIVE REPORT    DIAGNOSIS, LOCATION, PROCEDURE RECONFIRMED: yes   APPROPRIATE EYE PROTECTION for PATIENT: yes  APPROPRIATE EYE PROTECTION for PROVIDER and OTHERS PRESENT: yes  ANESTHESIA/PRE-OP MEDICATIONS: none  LASER SETTINGS:  (1)  WAVELENGTH: 595  LENS: 10  FLUENCE: 7.5  PULSE DURATION: 10  COOLIN/20    PROCEDURE NOTE:  Cheeks, nose, chin and FH treated with double stacked pulses with above settings. POST-OPERATIVE CARE/DISPOSITION: ice pack  COMPLICATIONS: none  MEDICATIONS: previously discussed consider clonidine for flushing but she notes low BP baseline and prefers not to  *mirvaso to [facility] didn't help price; didn't tolerate rhofade  WOUND CARE INSTRUCTIONS PROVIDED: yes    F/u 1-2 mos.      200    We also previously briefly discussed Botox and filler for glabellar and focal perioral lines where she is

## 2022-02-24 NOTE — PATIENT INSTRUCTIONS
Biopsy Wound Care Instructions    · Keep the bandage in place for 24 hours. · Cleanse the wound with mild soapy water daily   Gently dry the area.  Apply Vaseline or petroleum jelly to the wound using a cotton tipped applicator.  Cover with a clean bandage.  Repeat this process until the biopsy site is healed.  If you had stitches placed, continue treating the site until the stitches are removed. Remember to make an appointment to return to have your stitches removed by our staff.  You may shower and bathe as usual.       ** Biopsy results generally take around 7 business days to come back. If you have not heard from us by then, please call the office at [phone]. *Please note that biopsy results are released to both the patient and physician at the same time in [address] Please allow time for your physician to review the results.   One of our staff members will reach out to you with the results and plan.    200.00

## 2022-06-07 ENCOUNTER — TELEPHONE (OUTPATIENT)
Dept: DERMATOLOGY | Age: 52
End: 2022-06-07

## 2022-08-08 ENCOUNTER — TELEPHONE (OUTPATIENT)
Dept: DERMATOLOGY | Age: 52
End: 2022-08-08

## 2022-08-08 NOTE — TELEPHONE ENCOUNTER
Pt called requesting an appt for laser her 6 month appt Feb if possible   C/b [phone]  Please advise   Thanks

## 2022-08-23 ENCOUNTER — OFFICE VISIT (OUTPATIENT)
Dept: DERMATOLOGY | Age: 52
End: 2022-08-23
Payer: COMMERCIAL

## 2022-08-23 DIAGNOSIS — D22.9 BENIGN NEVUS: ICD-10-CM

## 2022-08-23 DIAGNOSIS — L71.9 ROSACEA: Primary | ICD-10-CM

## 2022-08-23 PROCEDURE — 99213 OFFICE O/P EST LOW 20 MIN: CPT | Performed by: DERMATOLOGY

## 2022-08-23 NOTE — PROGRESS NOTES
[facility]) Dermatology  Keri Brandt M.D.  [phone]       Zaina Kincaid  1970    50 y.o. female     Date of Visit: 8/23/2022    Chief Complaint:   Chief Complaint   Patient presents with    Rosacea     face        I was asked to see this patient by Dr. Burris ref. provider found. History of Present Illness:  1. Follow up rosacea    Persistent background erythema face- no inflammatory papules. New laser with appts scheduled for this winter. Very sensitive skin-very careful to use gentle wash, moisturizes with Elta MD and uses this as her sunscreen. 2 benign nevi left cheek-asymptomatic. Not itching, bleeding. Review of Systems:  Constitutional: Reports general sense of well-being       Past Medical History, Surgical History, Family History, Medications and Allergies reviewed. Social History:   Social History     Socioeconomic History    Marital status:      Spouse name: Not on file    Number of children: Not on file    Years of education: Not on file    Highest education level: Not on file   Occupational History    Not on file   Tobacco Use    Smoking status: Never    Smokeless tobacco: Never   Vaping Use    Vaping Use: Never used   Substance and Sexual Activity    Alcohol use: Yes    Drug use: Not on file    Sexual activity: Not on file   Other Topics Concern    Not on file   Social History Narrative    Not on file     Social Determinants of Health     Financial Resource Strain: Not on file   Food Insecurity: Not on file   Transportation Needs: Not on file   Physical Activity: Not on file   Stress: Not on file   Social Connections: Not on file   Intimate Partner Violence: Not on file   Housing Stability: Not on file       Physical Examination       -General: Well-appearing, NAD  1. Background erythema forehead, nose, cheeks, chin. No active inflammatory papules. Benign nevi left cheek      Assessment and Plan     1.  Rosacea -continue laser per Dr. Pat Martinez, avoid irritating products   2.  Benign nevus - Benign acquired melanocytic nevi  -Recommend monthly self skin exams   -Educated regarding the ABCDEs of melanoma detection   -Call for any new/changing moles or concerning lesions  -Reviewed sun protective behavior -- sun avoidance during the peak hours of the day, sun-protective clothing (including hat and sunglasses), sunscreen use (water resistant, broad spectrum, SPF at least 30, need for reapplication every 2 to 3 hours), avoidance of tanning beds

## 2023-02-14 ENCOUNTER — TELEPHONE (OUTPATIENT)
Dept: DERMATOLOGY | Age: 53
End: 2023-02-14

## 2023-02-14 NOTE — TELEPHONE ENCOUNTER
Pt calling states she has a question for Poli Velarde regarding her upcoming appt pls return call back @ [phone] to discuss

## 2023-02-14 NOTE — TELEPHONE ENCOUNTER
Recovering from a concussion and does not want her face worked on. Patient has broken capillaries on her legs (never addressed before) instead on vbeam on her face. Patient was looking for a way to keep her appointment on 2/21/2023. I advised patient that I could not guarantee treatment that day, but the areas will be addressed.

## 2023-02-21 ENCOUNTER — PROCEDURE VISIT (OUTPATIENT)
Dept: DERMATOLOGY | Age: 53
End: 2023-02-21

## 2023-02-21 DIAGNOSIS — I78.1 SPIDER VEINS: Primary | ICD-10-CM

## 2023-02-21 PROCEDURE — DM01310 VBEAM LASER SMALL OR 2 AREAS: Performed by: DERMATOLOGY

## 2023-02-21 NOTE — PATIENT INSTRUCTIONS
Following the procedure, the treated areas may be red or appear bruised and will likely be swollen for a few hours or up to a few days. The affected areas should be treated delicately following treatment. Discomfort and swelling should be treated with the application of hourly cool compresses the evening of the procedure. Avoid applying ice directly to the skin. If your face was treated, you may want to sleep with your head elevated on an extra pillow to help minimize swelling. Wear sunscreen daily and avoid strenuous activity following rosacea treatments to optimize results. Avoid extra aspirin, ibuprofen, vitamin E following the procedure - this may increase your chance of bruising. Improper care of the treated area while the discoloration is present may increase the chance of scarring or skin textural changes to the treated area. Please call the office if you have excessive discomfort, herpes simplex virus flare, or burns, blisters, or scabbing.     [address] [facility]

## 2023-02-21 NOTE — PROGRESS NOTES
Laser Procedure Note       Zaina Kincaid   YOB: 1970    DATE OF VISIT:  2023  Chief Complaint   Patient presents with    Procedure     VBEAM     LASER: Vbeam  DIAGNOSIS:  veins/telangiectasias    *recent concussion so doesn't want face treated today. Would like small veins on the legs treated - no previous trx of this area. Previous notes for facial erythema/rosacea:  Trx , , , , , , , , , ,  and  with improvement. Main c/o is erythema; has tried mirvaso in the past and would like to go back on this between treatments but is very expensive. Laser has helped. More improvement with recent trx with double stacks of pulses. Had IPL in the past - had some improvment but was many years ago. We discussed treatment options, including no treatment as well as the following:  - need for multiple treatments and risk of incomplete clearance, recurrence  - risk of erythema, edema, purpura, dyspigmentation and scarring    PATIENT IDENTIFIED PER PROTOCOL: yes  LOCATION(S): face  VERIFIED AND MARKED: yes  TECHNIQUES, RISKS, BENEFITS AND ALTERNATIVES EXPLAINED: yes  CONSENT SIGNED, WITNESSED AND DATED: yes        OPERATIVE REPORT    DIAGNOSIS, LOCATION, PROCEDURE RECONFIRMED: yes   APPROPRIATE EYE PROTECTION for PATIENT: yes  APPROPRIATE EYE PROTECTION for PROVIDER and OTHERS PRESENT: yes  ANESTHESIA/PRE-OP MEDICATIONS: none  LASER SETTINGS:  (1)  WAVELENGTH: 595  LENS: 3x10  FLUENCE: 13.5  PULSE DURATION: 10  COOLIN/20  (2) WAVELENGTH: 595  LENS: 10  FLUENCE: 7.5  PULSE DURATION: 10  COOLIN/20    PROCEDURE NOTE:  Individual vessels treated with 1-2 pulses with setting 1 with purpura or blanching. Then patches treated with second pass with setting 2.     POST-OPERATIVE CARE/DISPOSITION: ice packs  COMPLICATIONS: none  MEDICATIONS: previously discussed consider clonidine for flushing but she notes low BP baseline and prefers not to  *mirvaso to [facility] didn't help price; didn't tolerate rhofade  WOUND CARE INSTRUCTIONS PROVIDED: yes    F/u 1-2 mos. 200    We also previously briefly discussed Botox and filler for glabellar and focal perioral lines where she is getting some make-up accumulation that bothers her.

## 2023-03-27 ENCOUNTER — PROCEDURE VISIT (OUTPATIENT)
Dept: DERMATOLOGY | Age: 53
End: 2023-03-27

## 2023-03-27 DIAGNOSIS — I78.1 SPIDER VEINS: Primary | ICD-10-CM

## 2023-03-27 PROCEDURE — DM01310 VBEAM LASER SMALL OR 2 AREAS: Performed by: DERMATOLOGY

## 2023-03-27 NOTE — PATIENT INSTRUCTIONS
Following the procedure, the treated areas may be red or appear bruised and will likely be swollen for a few hours or up to a few days. The affected areas should be treated delicately following treatment. Discomfort and swelling should be treated with the application of hourly cool compresses the evening of the procedure. Avoid applying ice directly to the skin. If your face was treated, you may want to sleep with your head elevated on an extra pillow to help minimize swelling. Wear sunscreen daily and avoid strenuous activity following rosacea treatments to optimize results. Avoid extra aspirin, ibuprofen, vitamin E following the procedure - this may increase your chance of bruising. Improper care of the treated area while the discoloration is present may increase the chance of scarring or skin textural changes to the treated area.     Please call the office if you have excessive discomfort, herpes simplex virus flare, or burns, blisters, or scabbing.     200.00

## 2023-03-27 NOTE — PROGRESS NOTES
Laser Procedure Note       Zaina Kincaid   YOB: 1970    DATE OF VISIT:  3/27/2023  Chief Complaint   Patient presents with    Laser Treatment     rosacea     LASER: Vbeam  DIAGNOSIS:  veins/telangiectasias    *recent concussion so doesn't want face treated today. Would like small veins on the legs treated - 1 previous trx of this area last month with focal improvement. No complications. Previous notes for facial erythema/rosacea:  Trx , , , , , , , , , ,  and  with improvement. Main c/o is erythema; has tried mirvaso in the past and would like to go back on this between treatments but is very expensive. Laser has helped. More improvement with recent trx with double stacks of pulses. Had IPL in the past - had some improvment but was many years ago. We discussed treatment options, including no treatment as well as the following:  - need for multiple treatments and risk of incomplete clearance, recurrence  - risk of erythema, edema, purpura, dyspigmentation and scarring    PATIENT IDENTIFIED PER PROTOCOL: yes  LOCATION(S): face  VERIFIED AND MARKED: yes  TECHNIQUES, RISKS, BENEFITS AND ALTERNATIVES EXPLAINED: yes  CONSENT SIGNED, WITNESSED AND DATED: yes        OPERATIVE REPORT    DIAGNOSIS, LOCATION, PROCEDURE RECONFIRMED: yes   APPROPRIATE EYE PROTECTION for PATIENT: yes  APPROPRIATE EYE PROTECTION for PROVIDER and OTHERS PRESENT: yes  ANESTHESIA/PRE-OP MEDICATIONS: none  LASER SETTINGS:  (1)  WAVELENGTH: 595  LENS: 3x10  FLUENCE: 13.5-13.75  PULSE DURATION: 10  COOLIN/20  (2) WAVELENGTH: 595  LENS: 10  FLUENCE: 7.5  PULSE DURATION: 10  COOLIN/20    PROCEDURE NOTE:  Individual vessels treated with 1-2 pulses with setting 1 with purpura or blanching. Then focal patches treated with second pass with setting 2.     POST-OPERATIVE CARE/DISPOSITION: ice

## 2023-05-25 ENCOUNTER — PROCEDURE VISIT (OUTPATIENT)
Dept: DERMATOLOGY | Age: 53
End: 2023-05-25

## 2023-05-25 DIAGNOSIS — I78.1 SPIDER VEINS: Primary | ICD-10-CM

## 2023-05-25 PROCEDURE — DM01310 VBEAM LASER SMALL OR 2 AREAS: Performed by: DERMATOLOGY

## 2023-07-20 ENCOUNTER — TELEPHONE (OUTPATIENT)
Dept: DERMATOLOGY | Age: 53
End: 2023-07-20

## 2023-08-15 ENCOUNTER — TELEPHONE (OUTPATIENT)
Dept: DERMATOLOGY | Age: 53
End: 2023-08-15

## 2023-10-11 ENCOUNTER — TELEPHONE (OUTPATIENT)
Dept: DERMATOLOGY | Age: 53
End: 2023-10-11

## 2023-11-07 ENCOUNTER — PROCEDURE VISIT (OUTPATIENT)
Dept: DERMATOLOGY | Age: 53
End: 2023-11-07

## 2023-11-07 DIAGNOSIS — L71.9 ROSACEA: Primary | ICD-10-CM

## 2023-11-07 PROCEDURE — DM01310 VBEAM LASER SMALL OR 2 AREAS: Performed by: DERMATOLOGY

## 2023-11-07 NOTE — PROGRESS NOTES
Laser Procedure Note       Zaina Kincaid   YOB: 1970    DATE OF VISIT:  2023  Chief Complaint   Patient presents with    Procedure     VBEAM     LASER: Vbeam  DIAGNOSIS:  veins/telangiectasias    *concussion earlier this year so had deferred treatment of the face until now but is ready to proceed with treatment again. Small veins on the legs treated - 3 previous trx of this area ( and 3-2023) with significant improvement. No complications. Previous notes for facial erythema/rosacea:  Trx , , , , , , , , , ,  and  with improvement. Main c/o is erythema; has tried mirvaso in the past and would like to go back on this between treatments but is very expensive. Laser has helped. More improvement with recent trx with double stacks of pulses. Had IPL in the past - had some improvment but was many years ago. We discussed treatment options, including no treatment as well as the following:  - need for multiple treatments and risk of incomplete clearance, recurrence  - risk of erythema, edema, purpura, dyspigmentation and scarring    PATIENT IDENTIFIED PER PROTOCOL: yes  LOCATION(S): legs  VERIFIED AND MARKED: yes  TECHNIQUES, RISKS, BENEFITS AND ALTERNATIVES EXPLAINED: yes  CONSENT SIGNED, WITNESSED AND DATED: yes        OPERATIVE REPORT    DIAGNOSIS, LOCATION, PROCEDURE RECONFIRMED: yes   APPROPRIATE EYE PROTECTION for PATIENT: yes  APPROPRIATE EYE PROTECTION for PROVIDER and OTHERS PRESENT: yes  ANESTHESIA/PRE-OP MEDICATIONS: none  LASER SETTINGS:  (1)  WAVELENGTH: 595  LENS: 10  FLUENCE: 7.5  PULSE DURATION: 10  COOLIN/20     PROCEDURE NOTE:  Cheeks, nose, chin and FH treated with double stacked pulses with above settings.     POST-OPERATIVE CARE/DISPOSITION: ice packs  COMPLICATIONS: none  MEDICATIONS: previously discussed consider clonidine for flushing but

## 2023-12-12 ENCOUNTER — PROCEDURE VISIT (OUTPATIENT)
Dept: DERMATOLOGY | Age: 53
End: 2023-12-12

## 2023-12-12 DIAGNOSIS — L71.9 ROSACEA: Primary | ICD-10-CM

## 2023-12-12 PROCEDURE — DM01310 VBEAM LASER SMALL OR 2 AREAS: Performed by: DERMATOLOGY

## 2023-12-12 NOTE — PROGRESS NOTES
Laser Procedure Note       Zaina Kincaid   YOB: 1970    DATE OF VISIT:  2023  Chief Complaint   Patient presents with    Laser Treatment     LASER: Vbeam  DIAGNOSIS:  veins/telangiectasias    *concussion earlier this year so had deferred treatment of the face until now but is ready to continue with treatment again. Treated 1 month ago w/o complications - . Small veins on the legs treated - 3 previous trx of this area ( and 3-2023) with significant improvement. No complications. Previous notes for facial erythema/rosacea:  Trx , , , , -, , , , , ,  and  with improvement. Main c/o is erythema; has tried mirvaso in the past and would like to go back on this between treatments but is very expensive. Laser has helped. More improvement with recent trx with double stacks of pulses. Had IPL in the past - had some improvment but was many years ago. We discussed treatment options, including no treatment as well as the following:  - need for multiple treatments and risk of incomplete clearance, recurrence  - risk of erythema, edema, purpura, dyspigmentation and scarring    PATIENT IDENTIFIED PER PROTOCOL: yes  LOCATION(S): legs  VERIFIED AND MARKED: yes  TECHNIQUES, RISKS, BENEFITS AND ALTERNATIVES EXPLAINED: yes  CONSENT SIGNED, WITNESSED AND DATED: yes        OPERATIVE REPORT    DIAGNOSIS, LOCATION, PROCEDURE RECONFIRMED: yes   APPROPRIATE EYE PROTECTION for PATIENT: yes  APPROPRIATE EYE PROTECTION for PROVIDER and OTHERS PRESENT: yes  ANESTHESIA/PRE-OP MEDICATIONS: none  LASER SETTINGS:  (1)  WAVELENGTH: 595  LENS: 10  FLUENCE: 7.5  PULSE DURATION: 10  COOLIN/20     PROCEDURE NOTE:  Cheeks, nose, chin and FH treated with double stacked pulses with above settings.     POST-OPERATIVE CARE/DISPOSITION: ice packs  COMPLICATIONS: none  MEDICATIONS: previously

## 2024-01-15 NOTE — PROGRESS NOTES
Laser Procedure Note       Zaina Kincaid   YOB: 1970    DATE OF VISIT:  2024  Chief Complaint   Patient presents with    Laser Treatment     LASER: Vbeam  DIAGNOSIS:  veins/telangiectasias    *concussion earlier this year so had deferred treatment of the face until now but is ready to continue with treatment again.  Treated 1 month ago w/o complications - .    Small veins on the legs treated - 3 previous trx of this area ( and 3-2023) with significant improvement.  No complications.       Previous notes for facial erythema/rosacea:  Trx , , , , , , , , , ,  and  with improvement.    Main c/o is erythema; has tried mirvaso in the past and would like to go back on this between treatments but is very expensive.  Laser has helped.      More improvement with recent trx with double stacks of pulses.    Had IPL in the past - had some improvment but was many years ago.                                        We discussed treatment options, including no treatment as well as the following:  - need for multiple treatments and risk of incomplete clearance, recurrence  - risk of erythema, edema, purpura, dyspigmentation and scarring    PATIENT IDENTIFIED PER PROTOCOL: yes  LOCATION(S): legs  VERIFIED AND MARKED: yes  TECHNIQUES, RISKS, BENEFITS AND ALTERNATIVES EXPLAINED: yes  CONSENT SIGNED, WITNESSED AND DATED: yes        OPERATIVE REPORT    DIAGNOSIS, LOCATION, PROCEDURE RECONFIRMED: yes   APPROPRIATE EYE PROTECTION for PATIENT: yes  APPROPRIATE EYE PROTECTION for PROVIDER and OTHERS PRESENT: yes  ANESTHESIA/PRE-OP MEDICATIONS: none  LASER SETTINGS:  (1)  WAVELENGTH: 595  LENS: 10  FLUENCE: 7.5  PULSE DURATION: 10  COOLIN/20     PROCEDURE NOTE:  Cheeks, nose, chin and FH treated with double stacked pulses with above settings.    POST-OPERATIVE CARE/DISPOSITION: ice packs  COMPLICATIONS: none  MEDICATIONS: previously

## 2024-01-16 ENCOUNTER — PROCEDURE VISIT (OUTPATIENT)
Dept: DERMATOLOGY | Age: 54
End: 2024-01-16

## 2024-01-16 DIAGNOSIS — L71.9 ROSACEA: Primary | ICD-10-CM

## 2024-01-16 RX ORDER — TRETINOIN 0.025 %
CREAM (GRAM) TOPICAL
Qty: 20 G | Refills: 3 | Status: SHIPPED | OUTPATIENT
Start: 2024-01-16

## 2024-01-16 NOTE — PATIENT INSTRUCTIONS
Retin-A  1 tube of cream 20g of 0.025%    $15.95  [facility]  Member ID  [id number]  [facility]   [id number]   [id number]

## 2024-02-20 ENCOUNTER — PROCEDURE VISIT (OUTPATIENT)
Dept: DERMATOLOGY | Age: 54
End: 2024-02-20

## 2024-02-20 DIAGNOSIS — L71.9 ROSACEA: Primary | ICD-10-CM

## 2024-02-20 PROCEDURE — DM01310 VBEAM LASER SMALL OR 2 AREAS: Performed by: DERMATOLOGY

## 2024-03-21 ENCOUNTER — PROCEDURE VISIT (OUTPATIENT)
Dept: DERMATOLOGY | Age: 54
End: 2024-03-21

## 2024-03-21 DIAGNOSIS — L71.9 ROSACEA: Primary | ICD-10-CM

## 2024-03-21 NOTE — PATIENT INSTRUCTIONS
Following the procedure, the treated areas may be red or appear bruised and will likely be swollen for a few hours or up to a few days.  The affected areas should be treated delicately following treatment.  Discomfort and swelling should be treated with the application of hourly cool compresses the evening of the procedure.  Avoid applying ice directly to the skin.  If your face was treated, you may want to sleep with your head elevated on an extra pillow to help minimize swelling.    Wear sunscreen daily and avoid strenuous activity following rosacea treatments to optimize results.    Avoid extra aspirin, ibuprofen, vitamin E following the procedure - this may increase your chance of bruising.      Improper care of the treated area while the discoloration is present may increase the chance of scarring or skin textural changes to the treated area.    Please call the office if you have excessive discomfort, herpes simplex virus flare, or burns, blisters, or scabbing.

## 2024-03-21 NOTE — PROGRESS NOTES
Laser Procedure Note       Zaina Kincaid   YOB: 1970    DATE OF VISIT:  3/21/2024  Chief Complaint   Patient presents with    Procedure     LASER: Vbeam  DIAGNOSIS:  veins/telangiectasias    *concussion earlier this year so had deferred treatment of the face until now but is ready to continue with treatment again.  Treated 1 month ago w/o complications - -, -.    Small veins on the legs treated - 3 previous trx of this area ( and 3-2023) with significant improvement.  No complications.       Previous notes for facial erythema/rosacea:  Trx , , , , , , , , , ,  and  with improvement.    Main c/o is erythema; has tried mirvaso in the past and would like to go back on this between treatments but is very expensive.  Laser has helped.      More improvement with recent trx with double stacks of pulses.    Had IPL in the past - had some improvment but was many years ago.                                        We discussed treatment options, including no treatment as well as the following:  - need for multiple treatments and risk of incomplete clearance, recurrence  - risk of erythema, edema, purpura, dyspigmentation and scarring    PATIENT IDENTIFIED PER PROTOCOL: yes  LOCATION(S): legs  VERIFIED AND MARKED: yes  TECHNIQUES, RISKS, BENEFITS AND ALTERNATIVES EXPLAINED: yes  CONSENT SIGNED, WITNESSED AND DATED: yes        OPERATIVE REPORT    DIAGNOSIS, LOCATION, PROCEDURE RECONFIRMED: yes   APPROPRIATE EYE PROTECTION for PATIENT: yes  APPROPRIATE EYE PROTECTION for PROVIDER and OTHERS PRESENT: yes  ANESTHESIA/PRE-OP MEDICATIONS: none  LASER SETTINGS:  (1)  WAVELENGTH: 595  LENS: 10  FLUENCE: 7.5  PULSE DURATION: 10  COOLIN/20  (2)  WAVELENGTH: 595  LENS: 3x10  FLUENCE: 12.5  PULSE DURATION: 10  COOLIN/20     PROCEDURE NOTE:  Focal telangiectasias on the chin treated with setting 2 then   Cheeks, nose, chin and

## 2024-04-22 ENCOUNTER — PROCEDURE VISIT (OUTPATIENT)
Dept: DERMATOLOGY | Age: 54
End: 2024-04-22

## 2024-04-22 DIAGNOSIS — I78.1 SPIDER VEINS: Primary | ICD-10-CM

## 2024-04-22 PROCEDURE — DM01310 VBEAM LASER SMALL OR 2 AREAS: Performed by: DERMATOLOGY

## 2024-04-22 NOTE — PROGRESS NOTES
Laser Procedure Note       Zaina Kincaid   YOB: 1970    DATE OF VISIT:  2024  Chief Complaint   Patient presents with    Laser Treatment     LASER: Vbeam  DIAGNOSIS:  veins/telangiectasias    *concussion earlier this year so had deferred treatment of the face until now but is ready to continue with treatment again.  Treated 1 month ago w/o complications - -, -, 3-.    Small veins on the legs treated - 3 previous trx of this area ( and 3-2023) with significant improvement.  No complications.     *Here for trx of the legs today.    Previous notes for facial erythema/rosacea:  Trx , , , , , , , , , ,  and  with improvement.    Main c/o is erythema; has tried mirvaso in the past and would like to go back on this between treatments but is very expensive.  Laser has helped.      More improvement with recent trx with double stacks of pulses.    Had IPL in the past - had some improvment but was many years ago.                                        We discussed treatment options, including no treatment as well as the following:  - need for multiple treatments and risk of incomplete clearance, recurrence  - risk of erythema, edema, purpura, dyspigmentation and scarring    PATIENT IDENTIFIED PER PROTOCOL: yes  LOCATION(S): legs  VERIFIED AND MARKED: yes  TECHNIQUES, RISKS, BENEFITS AND ALTERNATIVES EXPLAINED: yes  CONSENT SIGNED, WITNESSED AND DATED: yes        OPERATIVE REPORT    DIAGNOSIS, LOCATION, PROCEDURE RECONFIRMED: yes   APPROPRIATE EYE PROTECTION for PATIENT: yes  APPROPRIATE EYE PROTECTION for PROVIDER and OTHERS PRESENT: yes  ANESTHESIA/PRE-OP MEDICATIONS: none  LASER SETTINGS:  (1)  WAVELENGTH: 595  LENS: 10  FLUENCE: 7.5  PULSE DURATION: 10  COOLIN/20  (2)  WAVELENGTH: 595  LENS: 3x10  FLUENCE: 12.5  PULSE DURATION: 10  COOLIN/20     PROCEDURE NOTE:  Individual spider veins on the legs

## 2024-05-02 ENCOUNTER — TELEPHONE (OUTPATIENT)
Dept: DERMATOLOGY | Age: 54
End: 2024-05-02

## 2024-06-04 ENCOUNTER — TELEPHONE (OUTPATIENT)
Dept: DERMATOLOGY | Age: 54
End: 2024-06-04

## 2024-06-19 ENCOUNTER — PATIENT MESSAGE (OUTPATIENT)
Dept: DERMATOLOGY | Age: 54
End: 2024-06-19

## 2024-06-19 RX ORDER — CLINDAMYCIN PHOSPHATE 10 UG/ML
LOTION TOPICAL
Qty: 60 ML | Refills: 4 | Status: SHIPPED | OUTPATIENT
Start: 2024-06-19

## 2024-06-19 NOTE — TELEPHONE ENCOUNTER
From: Zaina Kincaid  To: Dr. Misty Herndon  Sent: 6/19/2024 5:33 AM EDT  Subject: Razor Burn     Hello Dr. Herndon,  I have razor burn on my armpits that is 5 days old is still very uncomfortable and slow to heal. I also have a few pimple like spots from it as well as the red flat spots. I put aquafor on it but it seemed to irritate it even more. I now have a hard nodule underneath the skin. I am assuming I just irritated a lump node from the aquafor? Is there anything you recommend that I put on it? Of course, being 100 degrees outside doesn't help. LOL.  Thank you,  Zaina

## 2024-08-22 ENCOUNTER — TELEPHONE (OUTPATIENT)
Dept: DERMATOLOGY | Age: 54
End: 2024-08-22

## 2024-08-22 NOTE — TELEPHONE ENCOUNTER
Pt would like to get in soon to have 2 moles removed one on her Left breast and one on left shoulder [phone].

## 2024-10-02 ENCOUNTER — TELEPHONE (OUTPATIENT)
Dept: DERMATOLOGY | Age: 54
End: 2024-10-02

## 2024-10-09 ENCOUNTER — PATIENT MESSAGE (OUTPATIENT)
Dept: DERMATOLOGY | Age: 54
End: 2024-10-09

## 2024-10-15 ENCOUNTER — OFFICE VISIT (OUTPATIENT)
Dept: DERMATOLOGY | Age: 54
End: 2024-10-15
Payer: COMMERCIAL

## 2024-10-15 DIAGNOSIS — D22.9 BENIGN NEVUS: Primary | ICD-10-CM

## 2024-10-15 DIAGNOSIS — L57.8 DIFFUSE PHOTODAMAGE OF SKIN: ICD-10-CM

## 2024-10-15 DIAGNOSIS — L82.1 SEBORRHEIC KERATOSES: ICD-10-CM

## 2024-10-15 PROCEDURE — 99213 OFFICE O/P EST LOW 20 MIN: CPT | Performed by: DERMATOLOGY

## 2024-10-15 NOTE — PROGRESS NOTES
[facility]  Montserrat Lama M.D.  [phone]       Zaina Kincaid  1970    54 y.o. female     Date of Visit: 10/15/2024    Chief Complaint:   Chief Complaint   Patient presents with    Skin Lesion        I was asked to see this patient by Dr. Burris ref. provider found.    History of Present Illness:  1.  Lesion    Sees [employer] for cosmetics    Patient has noticed newly raised papule nasal dorsum.  Not itching, bleeding.  Asymptomatic.  Present for months    Raised papule left shoulder-new times months.  Not itching, bleeding.  Asymptomatic    Increasing number of lentigines dorsal forearms.  No discrete lesion itching, bleeding, growing.  Cosmetically bothersome      Review of Systems:  Constitutional: Reports general sense of well-being       Past Medical History, Surgical History, Family History, Medications and Allergies reviewed.    Social History:   Social History     Socioeconomic History    Marital status:      Spouse name: Not on file    Number of children: Not on file    Years of education: Not on file    Highest education level: Not on file   Occupational History    Not on file   Tobacco Use    Smoking status: Never    Smokeless tobacco: Never   Vaping Use    Vaping status: Never Used   Substance and Sexual Activity    Alcohol use: Yes    Drug use: Not on file    Sexual activity: Not on file   Other Topics Concern    Not on file   Social History Narrative    Not on file     Social Determinants of Health     Financial Resource Strain: Not on file   Food Insecurity: No Transportation Needs (7/11/2023)    Received from  [email or web address],  [email or web address],  [email or web address],  [email or web address],  [email or web address], [facility]    Yearly Questionnaire     Do you need any assistance with obtaining housing, meals, medication, transportation or medical equipment?: No     Assistance needed for:: Not on file   Transportation Needs: No Transportation Needs (7/11/2023)    Received from  [email or web address],  [email or web address],  [email or web address],  [email or web address], [facility],

## 2024-11-04 ENCOUNTER — PROCEDURE VISIT (OUTPATIENT)
Dept: DERMATOLOGY | Age: 54
End: 2024-11-04

## 2024-11-04 DIAGNOSIS — L71.9 ROSACEA: Primary | ICD-10-CM

## 2024-11-04 PROCEDURE — DM01310 VBEAM LASER SMALL OR 2 AREAS: Performed by: DERMATOLOGY

## 2024-11-04 RX ORDER — TRETINOIN 0.5 MG/G
CREAM TOPICAL
Qty: 20 G | Refills: 2 | Status: SHIPPED | OUTPATIENT
Start: 2024-11-04 | End: 2024-12-04

## 2024-11-04 NOTE — PROGRESS NOTES
Laser Procedure Note       Zaina Kincaid   YOB: 1970    DATE OF VISIT:  2024  Chief Complaint   Patient presents with    Laser Treatment     LASER: Vbeam  DIAGNOSIS:  veins/telangiectasias    Here for trx of rosacea - face.    *rosacea last treated in spring 2024 (3-2024) w/o complications.    *Small veins on the legs treated - few previous trx of this area ( and last ) with significant improvement.  No complications.       Previous notes for facial erythema/rosacea:  Trx , , , , , , , , , ,  and  with improvement.    Main c/o is erythema; has tried mirvaso in the past and would like to go back on this between treatments but is very expensive.  Laser has helped.      More improvement with recent trx with double stacks of pulses.    Had IPL in the past - had some improvment but was many years ago.                                        We discussed treatment options, including no treatment as well as the following:  - need for multiple treatments and risk of incomplete clearance, recurrence  - risk of erythema, edema, purpura, dyspigmentation and scarring    PATIENT IDENTIFIED PER PROTOCOL: yes  LOCATION(S): legs  VERIFIED AND MARKED: yes  TECHNIQUES, RISKS, BENEFITS AND ALTERNATIVES EXPLAINED: yes  CONSENT SIGNED, WITNESSED AND DATED: yes        OPERATIVE REPORT    DIAGNOSIS, LOCATION, PROCEDURE RECONFIRMED: yes   APPROPRIATE EYE PROTECTION for PATIENT: yes  APPROPRIATE EYE PROTECTION for PROVIDER and OTHERS PRESENT: yes  ANESTHESIA/PRE-OP MEDICATIONS: none  LASER SETTINGS:  (1)  WAVELENGTH: 595  LENS: 10  FLUENCE: 7.5  PULSE DURATION: 10  COOLIN/20  (2)  WAVELENGTH: 595  LENS: 3x10  FLUENCE: 12.5  PULSE DURATION: 10  COOLIN/20     PROCEDURE NOTE:  Focal telangiectasias on the chin treated with setting 2 then   Cheeks, nose, chin and FH treated with double stacked pulses with setting 1.    POST-OPERATIVE

## 2024-11-04 NOTE — PATIENT INSTRUCTIONS
Following the procedure, the treated areas may be red or appear bruised and will likely be swollen for a few hours or up to a few days.  The affected areas should be treated delicately following treatment.  Discomfort and swelling should be treated with the application of hourly cool compresses the evening of the procedure.  Avoid applying ice directly to the skin.  If your face was treated, you may want to sleep with your head elevated on an extra pillow to help minimize swelling.    Wear sunscreen daily and avoid strenuous activity following rosacea treatments to optimize results.    Avoid extra aspirin, ibuprofen, vitamin E following the procedure - this may increase your chance of bruising.      Improper care of the treated area while the discoloration is present may increase the chance of scarring or skin textural changes to the treated area.    Please call the office if you have excessive discomfort, herpes simplex virus flare, or burns, blisters, or scabbing.     200

## 2024-12-08 NOTE — PROGRESS NOTES
Laser Procedure Note       Zaina Kincaid   YOB: 1970    DATE OF VISIT:  2024  Chief Complaint   Patient presents with    Procedure     LASER: Vbeam  DIAGNOSIS:  veins/telangiectasias    Here for trx of rosacea - face.    *rosacea last treated 1 month ago  (previously 3-2024) w/o complications.  Continues to treat regularly in fall/winter mos for maintenance and then takes a break from trx in the spring/summer.    *Small veins on the legs treated - few previous trx of this area ( and last ) with significant improvement.  No complications.       Previous notes for facial erythema/rosacea:  Trx , , , , , , , , , ,  and  with improvement.    Main c/o is erythema; has tried mirvaso in the past and would like to go back on this between treatments but is very expensive.  Laser has helped.      More improvement with recent trx with double stacks of pulses.    Had IPL in the past - had some improvment but was many years ago.                                        We discussed treatment options, including no treatment as well as the following:  - need for multiple treatments and risk of incomplete clearance, recurrence  - risk of erythema, edema, purpura, dyspigmentation and scarring    PATIENT IDENTIFIED PER PROTOCOL: yes  LOCATION(S): legs  VERIFIED AND MARKED: yes  TECHNIQUES, RISKS, BENEFITS AND ALTERNATIVES EXPLAINED: yes  CONSENT SIGNED, WITNESSED AND DATED: yes        OPERATIVE REPORT    DIAGNOSIS, LOCATION, PROCEDURE RECONFIRMED: yes   APPROPRIATE EYE PROTECTION for PATIENT: yes  APPROPRIATE EYE PROTECTION for PROVIDER and OTHERS PRESENT: yes  ANESTHESIA/PRE-OP MEDICATIONS: none  LASER SETTINGS:  (1)  WAVELENGTH: 595  LENS: 10  FLUENCE: 7.5  PULSE DURATION: 10  COOLIN/20  (2)  WAVELENGTH: 595  LENS: 3x10  FLUENCE: 12.5  PULSE DURATION: 10  COOLIN/20     PROCEDURE NOTE:  Few focal telangiectasias on

## 2024-12-09 ENCOUNTER — PROCEDURE VISIT (OUTPATIENT)
Dept: DERMATOLOGY | Age: 54
End: 2024-12-09

## 2024-12-09 DIAGNOSIS — L71.9 ROSACEA: Primary | ICD-10-CM

## 2024-12-09 PROCEDURE — DM01310 VBEAM LASER SMALL OR 2 AREAS: Performed by: DERMATOLOGY

## 2024-12-09 RX ORDER — FERROUS SULFATE 325(65) MG
TABLET ORAL
COMMUNITY

## 2024-12-09 RX ORDER — MODAFINIL 100 MG/1
100 TABLET ORAL DAILY
COMMUNITY

## 2025-01-14 ENCOUNTER — PROCEDURE VISIT (OUTPATIENT)
Age: 55
End: 2025-01-14

## 2025-01-14 DIAGNOSIS — L71.9 ROSACEA: Primary | ICD-10-CM

## 2025-01-14 PROCEDURE — DM01310 VBEAM LASER SMALL OR 2 AREAS: Performed by: DERMATOLOGY

## 2025-01-14 NOTE — PROGRESS NOTES
Laser Procedure Note       Zaina Kincaid   YOB: 1970    DATE OF VISIT:  2025  Chief Complaint   Patient presents with    Laser Treatment     LASER: Vbeam  DIAGNOSIS:  veins/telangiectasias    Here for trx of rosacea - face.    *rosacea last treated 1 month ago  w/o complications.  Continues to treat regularly in fall/winter mos for maintenance and then takes a break from trx in the spring/summer.    *Small veins on the legs treated - few previous trx of this area ( and last ) with significant improvement.  No complications.       Previous notes for facial erythema/rosacea:  Trx , , , , , , , , , ,  and  with improvement.    Main c/o is erythema; has tried mirvaso in the past and would like to go back on this between treatments but is very expensive.  Laser has helped.      More improvement with recent trx with double stacks of pulses.    Had IPL in the past - had some improvment but was many years ago.                                        We discussed treatment options, including no treatment as well as the following:  - need for multiple treatments and risk of incomplete clearance, recurrence  - risk of erythema, edema, purpura, dyspigmentation and scarring    PATIENT IDENTIFIED PER PROTOCOL: yes  LOCATION(S): legs  VERIFIED AND MARKED: yes  TECHNIQUES, RISKS, BENEFITS AND ALTERNATIVES EXPLAINED: yes  CONSENT SIGNED, WITNESSED AND DATED: yes        OPERATIVE REPORT    DIAGNOSIS, LOCATION, PROCEDURE RECONFIRMED: yes   APPROPRIATE EYE PROTECTION for PATIENT: yes  APPROPRIATE EYE PROTECTION for PROVIDER and OTHERS PRESENT: yes  ANESTHESIA/PRE-OP MEDICATIONS: none  LASER SETTINGS:  (1)  WAVELENGTH: 595  LENS: 10  FLUENCE: 7.5  PULSE DURATION: 10  COOLIN/20  (2)  WAVELENGTH: 595  LENS: 3x10  FLUENCE: 12.5  PULSE DURATION: 10  COOLIN/20     PROCEDURE NOTE:  Few focal telangiectasias on the chin and

## 2025-02-25 ENCOUNTER — PROCEDURE VISIT (OUTPATIENT)
Age: 55
End: 2025-02-25

## 2025-02-25 DIAGNOSIS — L71.9 ROSACEA: Primary | ICD-10-CM

## 2025-02-25 PROCEDURE — DM01310 VBEAM LASER SMALL OR 2 AREAS: Performed by: DERMATOLOGY

## 2025-02-25 NOTE — PROGRESS NOTES
Laser Procedure Note       Zaina Kincaid   YOB: 1970    DATE OF VISIT:  2025  Chief Complaint   Patient presents with    Laser Treatment     LASER: Vbeam  DIAGNOSIS:  veins/telangiectasias    Here for trx of rosacea - face.    *rosacea last treated 1 month ago  w/o complications.  Continues to treat regularly in fall/winter mos for maintenance and then takes a break from trx in the spring/summer.    *Small veins on the legs treated - few previous trx of this area ( and last ) with significant improvement.  No complications.       Previous notes for facial erythema/rosacea:  Trx -, , , , , , , , , ,  and  with improvement.    Main c/o is erythema; has tried mirvaso in the past and would like to go back on this between treatments but is very expensive.  Laser has helped.      More improvement with recent trx with double stacks of pulses.    Had IPL in the past - had some improvment but was many years ago.                                        We discussed treatment options, including no treatment as well as the following:  - need for multiple treatments and risk of incomplete clearance, recurrence  - risk of erythema, edema, purpura, dyspigmentation and scarring    PATIENT IDENTIFIED PER PROTOCOL: yes  LOCATION(S): legs  VERIFIED AND MARKED: yes  TECHNIQUES, RISKS, BENEFITS AND ALTERNATIVES EXPLAINED: yes  CONSENT SIGNED, WITNESSED AND DATED: yes        OPERATIVE REPORT    DIAGNOSIS, LOCATION, PROCEDURE RECONFIRMED: yes   APPROPRIATE EYE PROTECTION for PATIENT: yes  APPROPRIATE EYE PROTECTION for PROVIDER and OTHERS PRESENT: yes  ANESTHESIA/PRE-OP MEDICATIONS: none  LASER SETTINGS:  (1)  WAVELENGTH: 595  LENS: 10  FLUENCE: 7.5  PULSE DURATION: 10  COOLIN/20  (2)  WAVELENGTH: 595  LENS: 3x10  FLUENCE: 12.5  PULSE DURATION: 10  COOLIN/20     PROCEDURE NOTE:  Few focal telangiectasias on the chin and

## 2025-03-25 ENCOUNTER — PROCEDURE VISIT (OUTPATIENT)
Age: 55
End: 2025-03-25

## 2025-03-25 DIAGNOSIS — I78.1 SPIDER VEINS: Primary | ICD-10-CM

## 2025-03-25 PROCEDURE — DM01310 VBEAM LASER SMALL OR 2 AREAS: Performed by: DERMATOLOGY

## 2025-03-25 NOTE — PROGRESS NOTES
Laser Procedure Note       Zaina Kincaid   YOB: 1970    DATE OF VISIT:  3/25/2025  Chief Complaint   Patient presents with    Laser Treatment     LASER: Vbeam  DIAGNOSIS:  veins/telangiectasias    Here for trx of veins - legs.  Had allergic reaction to metformin last week and prefers not to have face trx today.    *rosacea last treated 1 month ago  w/o complications.  Continues to treat regularly in fall/winter mos for maintenance and then takes a break from trx in the spring/summer.    *Small veins on the legs treated - few previous trx of this area ( and last ) with significant improvement.  No complications.       Previous notes for facial erythema/rosacea:  Trx -, , , , , , , , , ,  and  with improvement.    Main c/o is erythema; has tried mirvaso in the past and would like to go back on this between treatments but is very expensive.  Laser has helped.      More improvement with recent trx with double stacks of pulses.    Had IPL in the past - had some improvment but was many years ago.                                        We discussed treatment options, including no treatment as well as the following:  - need for multiple treatments and risk of incomplete clearance, recurrence  - risk of erythema, edema, purpura, dyspigmentation and scarring    PATIENT IDENTIFIED PER PROTOCOL: yes  LOCATION(S): legs  VERIFIED AND MARKED: yes  TECHNIQUES, RISKS, BENEFITS AND ALTERNATIVES EXPLAINED: yes  CONSENT SIGNED, WITNESSED AND DATED: yes        OPERATIVE REPORT    DIAGNOSIS, LOCATION, PROCEDURE RECONFIRMED: yes   APPROPRIATE EYE PROTECTION for PATIENT: yes  APPROPRIATE EYE PROTECTION for PROVIDER and OTHERS PRESENT: yes  ANESTHESIA/PRE-OP MEDICATIONS: none  LASER SETTINGS:  (1)  WAVELENGTH: 595  LENS: 10  FLUENCE: 7.5  PULSE DURATION: 10  COOLIN/20  (2)  WAVELENGTH: 595  LENS: 3x10  FLUENCE: 12.5  PULSE DURATION:

## 2025-04-28 ENCOUNTER — PROCEDURE VISIT (OUTPATIENT)
Age: 55
End: 2025-04-28

## 2025-04-28 DIAGNOSIS — I78.1 SPIDER VEINS: Primary | ICD-10-CM

## 2025-04-28 PROCEDURE — DM01310 VBEAM LASER SMALL OR 2 AREAS: Performed by: DERMATOLOGY

## 2025-04-28 NOTE — PROGRESS NOTES
Laser Procedure Note       Zaina Kincaid   YOB: 1970    DATE OF VISIT:  2025  Chief Complaint   Patient presents with    Laser Consultation     LASER: Vbeam  DIAGNOSIS:  veins/telangiectasias    Here for trx of veins - legs.  Had allergic reaction to metformin last month and migraine today so she defers trx of the face and would like legs treated instead.    *rosacea last treated - w/o complications.    Continues to treat regularly in /winter mos for maintenance and then takes a break from trx in the spring/summer.    *Small veins on the legs treated - few previous trx of this area ( and  + 3-2025) with significant improvement.  No complications.       Previous notes for facial erythema/rosacea:  Trx -, , , , , , , , , ,  and  with improvement.    Main c/o is erythema; has tried mirvaso in the past and would like to go back on this between treatments but is very expensive.  Laser has helped.      More improvement with recent trx with double stacks of pulses.    Had IPL in the past - had some improvment but was many years ago.                                        We discussed treatment options, including no treatment as well as the following:  - need for multiple treatments and risk of incomplete clearance, recurrence  - risk of erythema, edema, purpura, dyspigmentation and scarring    PATIENT IDENTIFIED PER PROTOCOL: yes  LOCATION(S): legs  VERIFIED AND MARKED: yes  TECHNIQUES, RISKS, BENEFITS AND ALTERNATIVES EXPLAINED: yes  CONSENT SIGNED, WITNESSED AND DATED: yes        OPERATIVE REPORT    DIAGNOSIS, LOCATION, PROCEDURE RECONFIRMED: yes   APPROPRIATE EYE PROTECTION for PATIENT: yes  APPROPRIATE EYE PROTECTION for PROVIDER and OTHERS PRESENT: yes  ANESTHESIA/PRE-OP MEDICATIONS: none  LASER SETTINGS:  (1)  WAVELENGTH: 595  LENS: 10  FLUENCE: 7.5  PULSE DURATION: 10  COOLIN/20  (2)  WAVELENGTH: 595

## 2025-05-21 ENCOUNTER — TELEPHONE (OUTPATIENT)
Age: 55
End: 2025-05-21

## 2025-08-06 ENCOUNTER — TELEPHONE (OUTPATIENT)
Age: 55
End: 2025-08-06